# Patient Record
Sex: MALE | Race: AMERICAN INDIAN OR ALASKA NATIVE
[De-identification: names, ages, dates, MRNs, and addresses within clinical notes are randomized per-mention and may not be internally consistent; named-entity substitution may affect disease eponyms.]

---

## 2020-10-05 ENCOUNTER — HOSPITAL ENCOUNTER (EMERGENCY)
Dept: HOSPITAL 5 - ED | Age: 50
Discharge: HOME | End: 2020-10-05
Payer: SELF-PAY

## 2020-10-05 VITALS — SYSTOLIC BLOOD PRESSURE: 121 MMHG | DIASTOLIC BLOOD PRESSURE: 73 MMHG
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
## 2020-10-05 DIAGNOSIS — F17.200: ICD-10-CM
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
## 2020-10-05 DIAGNOSIS — S99.922A: Primary | ICD-10-CM

## 2020-10-05 NOTE — XRAY REPORT
LEFT FOOT 3 VIEW(S)



INDICATION / CLINICAL INFORMATION:

box fell onto left big toe



COMPARISON:

None available.

 

FINDINGS:



BONES / JOINT(S): No acute fracture or subluxation. 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
t.

SOFT TISSUES: No significant abnormality.



ADDITIONAL FINDINGS: None.







Signer Name: Audie Daley MD 

Signed: 10/5/2020 1:04 PM

Workstation Name: [id number]

## 2020-10-05 NOTE — EMERGENCY DEPARTMENT REPORT
ED Lower Extremity HPI





- General


Chief Complaint: Extremity Injury, Lower


Stated Complaint: GREAT TOE INJURY


Time Seen by Provider: 10/05/20 12:33


Source: patient


Mode of arrival: Ambulatory


Limitations: No Limitations





- History of Present Illness


Initial Comments: 





Patient is a 49-year-old male presents emergency room with complaints of a left 

big toe injury that occurred earlier today.  He states that he was at work and 

was moving a heavy box approximately 75 pounds and accidentally fell onto his 

left big toe.  He states that he has some bruising to the left big toe.  He 

denies ever injuring this toe foot or ankle in the past.  He states he has been 

ambulatory.  He denies any numbness or weakness.  No past medical history.  No 

allergies to medications.





- Related Data


                                    Allergies











Allergy/AdvReac Type Severity Reaction Status Date / Time


 


No Known Allergies Allergy   Unverified 07/03/20 12:55














ED Review of Systems


ROS: 


Stated complaint: GREAT TOE INJURY


Other details as noted in HPI





Comment: All other systems reviewed and negative





ED Past Medical Hx





- Past Medical History


Previous Medical History?: No





- Surgical History


Past Surgical History?: No





- Social History


Smoking Status: Current Every Day Smoker


Substance Use Type: None





ED Physical Exam





- General


Limitations: No Limitations


General appearance: alert, in no apparent distress





- Head


Head exam: Present: atraumatic, normocephalic





- Eye


Eye exam: Present: normal appearance





- ENT


ENT exam: Present: mucous membranes moist





- Extremities Exam


Extremities exam: Present: other (ttp to the distal end of the left big toe, 

there is mild amount of ecchymosis just superior to the nail bed, no damage to 

the nail or nail bed, no abrasion or laceration, no ttp to the other toes, foot,

or ankle, FROM of the left foot, ankle, toes, big toe, neurovascularly intact)





- Neurological Exam


Neurological exam: Present: alert, oriented X3





- Psychiatric


Psychiatric exam: Present: normal affect, normal mood





- Skin


Skin exam: Present: warm, dry





ED Course


                                   Vital Signs











  10/05/20





  11:34


 


Temperature 98.2 F


 


Pulse Rate 82


 


Respiratory 20





Rate 


 


Blood Pressure 121/73


 


O2 Sat by Pulse 97





Oximetry 














ED Lower Extremity MDM





- Radiology Data


Radiology results: report reviewed





LEFT FOOT 3 VIEW(S) 





INDICATION / CLINICAL INFORMATION: 


box fell onto left big toe 





COMPARISON: 


None available. 





FINDINGS: 





BONES / JOINT(S): No acute fracture or subluxation. Moderate degenerative 

arthrosis of first TMT 


 joint. 


SOFT TISSUES: No significant abnormality. 





ADDITIONAL FINDINGS: None. 











Signer Name: Audie Daley MD 


Signed: 10/5/2020 1:04 PM 


Workstation Name: [id number] 








 Transcribed By: TL 


 Dictated By: Audie Daley MD 


 Electronically Authenticated By: Audie Daley MD 


 Signed Date/Time: 10/05/20 1304 











 DD/DT: 10/05/20 1303 


 TD/TT: 





- Medical Decision Making





Patient is a 49-year-old male presents emergency room with complaints of a left 

big toe injury that occurred earlier today.  He states that he was at work and 

was moving a heavy box approximately 75 pounds and accidentally fell onto his 

left big toe.  He states that he has some bruising to the left big toe.  He 

denies ever injuring this toe foot or ankle in the past.  He states he has been 

ambulatory.  He denies any numbness or weakness.  No past medical history.  No 

allergies to medications. vitals are normal. on exam: ttp to the distal end of 

the left big toe, there is mild amount of ecchymosis just superior to the nail 

bed, no damage to the nail or nail bed, no abrasion or laceration, no ttp to the

other toes, foot, or ankle, FROM of the left foot, ankle, toes, big toe, 

neurovascularly intact. XR left foot: BONES / JOINT(S): No acute fracture or 

subluxation. Moderate degenerative arthrosis of first TMT joint. SOFT TISSUES: 

No significant abnormality. ADDITIONAL FINDINGS: None.  Discussed all results 

with patient and answered questions. advised pt May alternate Tylenol or 

ibuprofen as needed for discomfort.  May ice for 15 min at a time, rest, elevate

the leg.  Follow-up with your primary care doctor.  Return to emergency room for

any new or worsening symptoms.





- Differential Diagnosis


strain, sprain, fx, dislocation, DJD, arthritis, contusion


Critical care attestation.: 


If time is entered above; I have spent that time in minutes in the direct care 

of this critically ill patient, excluding procedure time.








ED Disposition


Clinical Impression: 


Injury of left great toe


Qualifiers:


 Encounter type: initial encounter Qualified Code(s): S99.922A - Unspecified 

injury of left foot, initial encounter





Disposition: DC-01 TO HOME OR SELFCARE


Is pt being admited?: No


Does the pt Need Aspirin: No


Condition: Stable


Instructions:  Osteoarthritis (ED), Foot Contusion (ED)


Additional Instructions: 


May alternate Tylenol or ibuprofen as needed for discomfort.  May ice for 15 min

at a time, rest, elevate the leg.  Follow-up with your primary care doctor.  

Return to emergency room for any new or worsening symptoms.


Referrals: 


your, primary care doctor [Other] - 2-3 Days


Forms:  Work/School Release Form(ED)


Time of Disposition: 13:31


Print Language: ENGLISH

## 2021-11-11 ENCOUNTER — HOSPITAL ENCOUNTER (EMERGENCY)
Dept: HOSPITAL 5 - ED | Age: 51
Discharge: HOME | End: 2021-11-11
Payer: SELF-PAY

## 2021-11-11 VITALS — SYSTOLIC BLOOD PRESSURE: 130 MMHG | DIASTOLIC BLOOD PRESSURE: 76 MMHG

## 2021-11-11 DIAGNOSIS — E66.9: ICD-10-CM

## 2021-11-11 DIAGNOSIS — F17.200: ICD-10-CM

## 2021-11-11 DIAGNOSIS — M25.562: Primary | ICD-10-CM

## 2021-11-11 PROCEDURE — 99283 EMERGENCY DEPT VISIT LOW MDM: CPT

## 2021-11-11 NOTE — EMERGENCY DEPARTMENT REPORT
ED General Adult HPI





- General


Chief complaint: Extremity Injury, Lower


Stated complaint: LEFT KNEE PAIN


Time Seen by Provider: 11/11/21 10:56


Source: patient


Mode of arrival: Wheelchair


Limitations: No Limitations





- History of Present Illness


Initial comments: 





50-year-old -American male patient without past medical history presents 

with complaints of left knee pain and swelling x1 month worsening over the past 

2 weeks.  He denies any injury, fever/chills/sweats, urinary symptoms, or 

difficulty moving the knee.  Patient states the pain began a month ago while 

walking and he suddenly felt a pop.  He has not tried any OTC medications for 

his symptoms.  He rates his current pain as a 7/10 in severity and states it 

worsens with ambulation and extension of the knee.  He denies any 

numbness/tingling or weakness in the leg.


Severity scale (0 -10): 8





- Related Data


                                  Previous Rx's











 Medication  Instructions  Recorded  Last Taken  Type


 


Naproxen [Naprosyn TAB] 500 mg PO BID PRN #20 tablet 11/11/21 Unknown Rx











                                    Allergies











Allergy/AdvReac Type Severity Reaction Status Date / Time


 


No Known Allergies Allergy   Verified 11/11/21 10:49














ED Review of Systems


ROS: 


Stated complaint: LEFT KNEE PAIN


Other details as noted in HPI





Constitutional: denies: chills, diaphoresis, fever, malaise, weakness


Genitourinary: denies: frequency, hematuria, discharge


Musculoskeletal: joint swelling, arthralgia


Skin: denies: rash, change in color


Neurological: denies: numbness, paresthesias





ED Past Medical Hx





- Past Medical History


Previous Medical History?: No





- Surgical History


Past Surgical History?: No





- Social History


Smoking Status: Current Every Day Smoker


Substance Use Type: None





- Medications


Home Medications: 


                                Home Medications











 Medication  Instructions  Recorded  Confirmed  Last Taken  Type


 


Naproxen [Naprosyn TAB] 500 mg PO BID PRN #20 tablet 11/11/21  Unknown Rx














ED Physical Exam





- General


Limitations: No Limitations


General appearance: alert, in no apparent distress, obese (morbid)





- Head


Head exam: Present: atraumatic, normocephalic





- Eye


Eye exam: Present: normal appearance.  Absent: scleral icterus





- Respiratory


Respiratory exam: Absent: respiratory distress





- Cardiovascular


Cardiovascular Exam: Present: regular rate





- Extremities Exam


Extremities exam: Absent: calf tenderness (No swelling or tenderness noted to 

lower extremities bilaterally)





- Expanded Lower Extremity Exam


  ** Left


Knee exam: Present: full ROM, tenderness (Noted to anterior medial joint line), 

effusion (Mild anterior medial).  Absent: abrasion, laceration, ecchymosis, 

deformity, crepidus


Lower Leg exam: Present: normal inspection


Neuro vascular tendon exam: Present: no vascular compromise


Gait: Positive: antalgic





- Neurological Exam


Neurological exam: Present: alert, oriented X3





- Psychiatric


Psychiatric exam: Present: normal affect, normal mood





- Skin


Skin exam: Present: warm, dry, intact, normal color.  Absent: rash





ED Course


                                   Vital Signs











  11/11/21 11/11/21





  10:51 11:12


 


Temperature 98.9 F 


 


Pulse Rate 87 


 


Respiratory 20 16





Rate  


 


Blood Pressure 130/76 





[Right]  


 


O2 Sat by Pulse 95 





Oximetry  














ED Medical Decision Making





- Radiology Data


Radiology results: report reviewed





XR knee 3V LT  


 


 INDICATION / CLINICAL INFORMATION:  


 pain and swelling, medial/anterior.  


 


 COMPARISON:  


 None available.  


 


 FINDINGS:  


 BONES/JOINT(S): No acute fracture or subluxation. Mild tricompartmental 

osteoarthritis with a joint


effusion.  


 


 SOFT TISSUES: No significant abnormality.  


 


 ADDITIONAL FINDINGS: None.  


 





- Medical Decision Making








50-year-old -American male patient without past medical history presents 

with complaints of left knee pain and swelling x1 month worsening over the past 

2 weeks.  He denies any injury, fever/chills/sweats, urinary symptoms, or diffic

ulty moving the knee.  Patient states the pain began a month ago while walking 

and he suddenly felt a pop.  He has not tried any OTC medications for his 

symptoms.  He rates his current pain as a 7/10 in severity and states it worsens

 with ambulation and extension of the knee.  He denies any numbness/tingling or 

weakness in the leg.





X-rays negative for any acute abnormalities and shows arthritic changes.  

Recommend follow-up with orthopedics for further evaluation and treatment.  

Conservative treatment for now with rice method and meds recommended.  Also 

advised weight loss.  Discussed presumptive diagnosis, care plan, and signs and 

symptoms that should prompt immediate return to the ED with patient verbalized 

understanding.


Critical care attestation.: 


If time is entered above; I have spent that time in minutes in the direct care 

of this critically ill patient, excluding procedure time.








ED Disposition


Clinical Impression: 


 Left knee pain, Overweight or obesity





Disposition: 01 HOME / SELF CARE / HOMELESS


Is pt being admited?: No


Condition: Stable


Instructions:  Acute Knee Pain, Adult, Obesity, Adult, Easy-to-Read


Prescriptions: 


Naproxen [Naprosyn TAB] 500 mg PO BID PRN #20 tablet


 PRN Reason: pain


Referrals: 


[facility] [Provider Group] - 3-5 Days


Forms:  Work/School Release Form(ED)

## 2021-11-11 NOTE — XRAY REPORT
XR knee 3V LT



INDICATION / CLINICAL INFORMATION:

pain and swelling, medial/anterior.



COMPARISON:

None available.

 

FINDINGS:

BONES/JOINT(S): No acute fracture or subluxation. Mild tricompartmental osteoarthritis with a joint e
ffusion.



SOFT TISSUES: No significant abnormality.



ADDITIONAL FINDINGS: None.







Signer Name: Rom Kelsey MD 

Signed: 11/11/2021 11:38 AM

Workstation Name: [id number]